# Patient Record
Sex: FEMALE | Race: WHITE | Employment: STUDENT | URBAN - METROPOLITAN AREA
[De-identification: names, ages, dates, MRNs, and addresses within clinical notes are randomized per-mention and may not be internally consistent; named-entity substitution may affect disease eponyms.]

---

## 2017-08-29 ENCOUNTER — MEDICAL CORRESPONDENCE (OUTPATIENT)
Dept: HEALTH INFORMATION MANAGEMENT | Facility: CLINIC | Age: 27
End: 2017-08-29

## 2017-09-27 ENCOUNTER — PRE VISIT (OUTPATIENT)
Dept: NEUROLOGY | Facility: CLINIC | Age: 27
End: 2017-09-27

## 2017-09-27 NOTE — TELEPHONE ENCOUNTER
1.  Date/reason for appt:  10/05/17   Legs - Numbness/Weakness    2.  Referring provider:  Janae    3.  Call to patient (Yes / No - short description):  No referred    4.  Previous care at / records requested from:  Janae

## 2017-10-03 ENCOUNTER — OFFICE VISIT (OUTPATIENT)
Dept: NEUROLOGY | Facility: CLINIC | Age: 27
End: 2017-10-03

## 2017-10-03 DIAGNOSIS — M62.81 GENERALIZED MUSCLE WEAKNESS: Primary | ICD-10-CM

## 2017-10-03 NOTE — PROGRESS NOTES
[facility]  Electrodiagnostic Laboratory    Nerve Conduction & EMG Report          Patient:       Nathalie Valle  Patient ID:    [id number]  Gender:        Female  YOB: 1990  Age:           27 Years 0 Months        History & Examination:  27 year old woman with periodic weakness of legs after sprinting. If she stops sprinting symptoms resolve within seconds, other than residual fatigue. Occurs once per year over last 3-4 years. No similar symptoms in arms. Eval for myopathy vs neuropathy.     Techniques: Motor and sensory conduction studies were done with surface recording electrodes. EMG was done with a concentric needle electrode.     Results:  Nerve conduction studies:   1. Bilateral sural sensory responses were normal.   2. Bilateral peroneal-EDB and tibial-AH motor responses were normal.     Needle EMG of selected right and left lower limb and thoracic paraspinal muscles was performed as tabulated below. No abnormal spontaneous activity was observed in the sampled muscles. Motor unit potential morphology and recruitment patterns were normal.     Interpretation:  This is a normal study. There is no electrophysiologic evidence of a myopathic or neuropathic process affecting the lower limbs on the basis of this study.     Colton Puga MD  Department of Neurology        Sensory NCS      Nerve / Sites Rec. Site Onset Peak NP Amp Ref. PP Amp Dist Mike Ref. Temp     ms ms  V  V  V cm m/s m/s  C   L SURAL - Lat Mall      Calf Ankle 2.71 3.59 17.7 8.0 21.4 14 51.7 38.0 33.8   R SURAL - Lat Mall      Calf Ankle 3.02 3.85 16.4 8.0 15.9 14 46.3 38.0 33.8       Motor NCS      Nerve / Sites Rec. Site Lat Ref. Amp Ref. Rel Amp Dist Mike Ref. Dur. Area Temp.     ms ms mV mV % cm m/s m/s ms %  C   L DEEP PERONEAL - EDB      Ankle EDB 4.58 6.00 5.9 2.5 100 8   5.94 100 34.1      FibHead EDB 11.04  5.4  91.1 29 44.9 38.0 6.41 87.2 33.9      Pop Fos EDB 13.28  5.4  90.2 11 49.1 38.0 6.20 82.1 34   R  DEEP PERONEAL - EDB      Ankle EDB 4.22 6.00 6.0 2.5 100 8   5.99 100 34.1      FibHead EDB 10.16  6.0  99.2 28.5 48.0 38.0 6.88 108 34.3      Pop Fos EDB 12.29  6.1  101 10 46.8 38.0 6.93 109 34.3   L TIBIAL - AH      Ankle AH 3.13 6.00 12.9 4.0 100 8   7.50 100 34.4      Pop Fos AH 12.08  9.9  76.9 39 43.5 38.0 7.92 98.8 34   R TIBIAL - AH      Ankle AH 4.90 6.00 12.5 4.0 100 8   5.99 100 34.1      Pop Fos AH 13.39  3.9  31.5 39 45.9 38.0 7.60 45.9 34.1       F  Wave      Nerve Min F Lat Max F Lat Mean FLat Temp.    ms ms ms  C   L TIBIAL 45.44 48.31 46.71 33.1   R TIBIAL 47.40 51.17 48.49 34       EMG Summary Table     Spontaneous MUAP Recruitment    IA Fib PSW Fasc H.F. Amp Dur. PPP Pattern   L. VAST LATERALIS N None None None None N N N N   L. TIB ANTERIOR N None None None None N N N N   L. GASTROCN (MED) N None None None None N N N N   L. BIC FEM (S HEAD) N None None None None N N N N   L. THOR PSP (M) N None None None None       R. VAST LATERALIS N None None None None N N N N   R. GASTROCN (MED) N None None None None N N N N

## 2017-10-03 NOTE — MR AVS SNAPSHOT
After Visit Summary   10/3/2017    Nathalie Valle    MRN: [id number]           Patient Information     Date Of Birth          1990        Visit Information        Provider Department      10/3/2017 9:00 AM Colton Puga MD [facility] NEUROSPECIALTIES         Follow-ups after your visit        Your next 10 appointments already scheduled     Oct 12, 2017 10:00 AM CDT   (Arrive by 9:45 AM)   New Patient Visit with TRENA Aceves [facility] ([facility])    [address] [place] [id number] [phone]              Who to contact     Please call your clinic at [phone] to:    Ask questions about your health    Make or cancel appointments    Discuss your medicines    Learn about your test results    Speak to your doctor   If you have compliments or concerns about an experience at your clinic, or if you wish to file a complaint, please contact [facility] Patient Relations at [phone] or email us at [email or web address]         Additional Information About Your Visit        MyChart Information     Appcore is an electronic gateway that provides easy, online access to your medical records. With Appcore, you can request a clinic appointment, read your test results, renew a prescription or communicate with your care team.     To sign up for Hydroboltt visit the website at [email or web address]   You will be asked to enter the access code listed below, as well as some personal information. Please follow the directions to create your username and password.     Your access code is: [email or web address]  Expires: 2017  9:30 AM     Your access code will  in 90 days. If you need help or a new code, please contact your [facility] or call [phone] for assistance.        Care EveryWhere ID     This is your Care EveryWhere ID. This could be used  by other organizations to access your [facility] medical records  [id number]         Blood Pressure from Last 3 Encounters:   No data found for BP    Weight from Last 3 Encounters:   No data found for Wt              Today, you had the following     No orders found for display       Primary Care Provider    None Specified       No primary provider on file.        Equal Access to Services     KENZIEDESTINY LORENZO : Hadii aad skyler hadluíso Soomaali, waaxda luqadaha, qaybta kaalmada adeegyada, santi plataaranzanuzhat zamora . So [facility] [phone].    ATENCIÓN: Si habla español, tiene a mckeon disposición servicios gratuitos de asistencia lingüística. Llame al [phone].    We comply with applicable federal civil rights laws and [place] laws. We do not discriminate on the basis of race, color, national origin, age, disability, sex, sexual orientation, or gender identity.            Thank you!     Thank you for choosing [facility] NEUROSPECIALTIES  for your care. Our goal is always to provide you with excellent care. Hearing back from our patients is one way we can continue to improve our services. Please take a few minutes to complete the written survey that you may receive in the mail after your visit with us. Thank you!             Your Updated Medication List - Protect others around you: Learn how to safely use, store and throw away your medicines at www.disposemymeds.org.      Notice  As of 10/3/2017 10:17 AM    You have not been prescribed any medications.

## 2017-10-03 NOTE — LETTER
10/3/2017     RE: Nathalie Valle  [address] [address] [facility] [place] [address]     Dear Colleague,    Thank you for referring your patient, Nathalie Valle, to the P NEUROSPECIALTIES at [facility]. Please see a copy of my visit note below.        [facility]  Electrodiagnostic Laboratory    Nerve     Conduction & EMG Report          Patient:       Nathalie Valle  Patient ID:    [id number]  Gender:        Female  YOB: 1990  Age:           27 Years 0 Months        History & Examination:  27 year old woman with periodic weakness of legs after sprinting. If she stops sprinting symptoms resolve within seconds, other than residual fatigue. Occurs once per year over last 3-4 years. No similar symptoms in arms. Eval for myopathy vs neuropathy.     Techniques: Motor and sensory conduction studies were done with surface recording electrodes. EMG was done with a concentric needle electrode.     Results:  Nerve conduction studies:   1. Bilateral sural sensory responses were normal.   2. Bilateral peroneal-EDB and tibial-AH motor responses were normal.     Needle EMG of selected right and left lower limb and thoracic paraspinal muscles was performed as tabulated below. No abnormal spontaneous activity was observed in the sampled muscles. Motor unit potential morphology and recruitment patterns were normal.     Interpretation:  This is a normal study. There is no electrophysiologic evidence of a myopathic or neuropathic process affecting the lower limbs on the basis of this study.     Colton Puga MD  Department of Neurology        Sensory NCS      Nerve / Sites Rec. Site Onset Peak NP Amp Ref. PP Amp Dist Mike Ref. Temp     ms ms  V  V  V cm m/s m/s  C   L SURAL - Lat Mall      Calf Ankle 2.71 3.59 17.7 8.0 21.4 14 51.7 38.0 33.8   R SURAL - Lat Mall      Calf Ankle 3.02 3.85 16.4 8.0 15.9 14 46.3 38.0 33.8       Motor NCS      Nerve / Sites Rec. Site Lat Ref. Amp  Ref. Rel Amp Dist Mike Ref. Dur. Area Temp.     ms ms mV mV % cm m/s m/s ms %  C   L DEEP PERONEAL - EDB      Ankle EDB 4.58 6.00 5.9 2.5 100 8   5.94 100 34.1      FibHead EDB 11.04  5.4  91.1 29 44.9 38.0 6.41 87.2 33.9    Pop Fos EDB 13.28  5.4  90.2 11 49.1 38.0 6.20 82.1 34   R DEEP PERONEAL - EDB      Ankle EDB 4.22 6.00 6.0 2.5 100 8   5.99 100 34.1      FibHead EDB 10.16  6.0  99.2 28.5 48.0 38.0 6.88 108 34.3     Pop Fos EDB 12.29  6.1  101 10 46.8 38.0 6.93 109 34.3   L TIBIAL - AH      Ankle AH 3.13 6.00 12.9 4.0 100 8   7.50 100 34.4     Pop Fos AH 12.08  9.9  76.9 39 43.5 38.0 7.92 98.8 34   R TIBIAL - AH      Ankle AH 4.90 6.00 12.5 4.0 100 8   5.99 100 34.1      Pop Fos AH 13.39  3.9  31.5 39 45.9 38.0 7.60 45.9 34.1       F  Wave      Nerve Min F Lat Max F Lat Mean FLat Temp.    ms ms ms  C   L TIBIAL 45.44 48.31 46.71 33.1   R TIBIAL 47.40 51.17 48.49 34       EMG Summary Table     Spontaneous MUAP Recruitment    IA Fib PSW Fasc H.F. Amp Dur. PPP Pattern   L. VAST LATERALIS N None None None None N N N N   L. TIB ANTERIOR N None None None None N N N N   L. GASTROCN (MED) N None None None None N N N N   L. BIC FEM (S HEAD) N None None None None N N N N   L. THOR PSP (M) N None None None None       R. VAST LATERALIS N None None None None N N N N   R. GASTROCN (MED) N None None None None N N N N                      Again, thank you for allowing me to participate in the care of your patient.      Sincerely,    Colton Puga MD

## 2017-10-12 ENCOUNTER — OFFICE VISIT (OUTPATIENT)
Dept: NEUROLOGY | Facility: CLINIC | Age: 27
End: 2017-10-12

## 2017-10-12 ENCOUNTER — TELEPHONE (OUTPATIENT)
Dept: NEUROLOGY | Facility: CLINIC | Age: 27
End: 2017-10-12

## 2017-10-12 VITALS — HEIGHT: 66 IN | SYSTOLIC BLOOD PRESSURE: 136 MMHG | HEART RATE: 84 BPM | DIASTOLIC BLOOD PRESSURE: 80 MMHG

## 2017-10-12 DIAGNOSIS — M62.81 EXERCISE-INDUCED WEAKNESS: Primary | ICD-10-CM

## 2017-10-12 ASSESSMENT — PAIN SCALES - GENERAL: PAINLEVEL: NO PAIN (0)

## 2017-10-12 NOTE — LETTER
"10/12/2017       RE: Nathalie Valle  [address] [address] [place] [address]     Dear Colleague,    Thank you for referring your patient, Nathalie Valle, to the [facility] NEUROLOGY at [facility]. Please see a copy of my visit note below.    Re: Nathalie Valle      MRN# [id number]  YOB: 1990  Date of Visit:10/12/2017     OUTPATIENT NEUROLOGY VISIT NOTE    Chief Complaint:  transient bilateral muscle weakness brought by high intensity exercise that require \"burst of energy.\"    History of Present Illness  aNthalie Valle is a 27-year-old right-handed presents to the clinic today for transient bilateral muscle weakness brought by high intensity exercise that require \"burst of energy.\" No significant medical history.   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     Reports over past 4-5 years about once per year (at least) loses \"motor control\" of her legs during high intensity exercise (sprinting/running) and last when she gets her heart rate down and stop running \"legs feeling exhausted\" for the rest of the day. Legs are not painful. No changes in urine color or any other associated symptoms reported. Takes about 4-5 days to get back to \"normal\" and feeling that she can go and run again.  Patient reports that she eats healthy, rest before the game (plays softball recreational), staying hydration. Reports that her legs get \"numb\" and \"jelly feeling\" and looks like \"she broke her legs.\"   Patient reports that it has been re occuring but has not gotten worse.   [facility].   EMG  Interpretation:  This is a normal study. There is no electrophysiologic evidence of a myopathic or neuropathic process affecting the lower limbs on the basis of this study.     Reports concussion in 2011 wake boarding and did not get medical evaluation. " "    Denies history of recent  head or neck trauma, dizziness, vertigo, headaches, seizure, double vision, blurred vision, hearing difficulty, speech or swallowing difficulty, weakness or numbness in face, arms or legs, urinary or bowel incontinence, coordination problems or gait difficulty, fever or chills.    Neurodiagnostic Testing  Interpretation:  This is a normal study. There is no electrophysiologic evidence of a myopathic or neuropathic process affecting the lower limbs on the basis of this study.     CT head none  MRI brain none  Lab  At [facility] -patient will sent to us results  Presumed normal     Past Medical History reviewed and verified with the patient  No significant medical history  Past Surgical History reviewed and verified with the patient  Tonsillectomy in 2009  [place] teeth removed   Family History reviewed and verified with the patient  No neurological history  Social History:  In relationship, lives with SO, has two dogs, grad student full time, was born in [place]  Social History   Substance Use Topics     Smoking status: Never Smoker     Smokeless tobacco: Never Used     Alcohol use Not on file    reviewed and verified with the patient    Current Outpatient Prescriptions   Medication Sig Dispense Refill     Norethindrone Acet-Ethinyl Est (MICROGESTIN 1/20 PO)      reviewed and verified with the patient    Review of Systems:   A 12-point ROS including constitutional, eyes, ENT, respiratory, cardiovascular, gastroenterology, genitourinary, integumentary, musculoskeletal, neurology, hematology and psychiatric were all reviewed with the patient and completed at the Neuroscience Services Question nary and positives as mentioned in the HPI.     General Exam:   /80  Pulse 84  Ht 1.676 m (5' 6\")  GEN: Awake, NAD; good eye contact, responses appropriately, healthy appearing   HEENT: Head atraumatic/Normocephalic. Scalp normal. Pupils equally round, 4 mm, reactive to light and accommodation, sclera " and conjunctiva normal. Fundoscopic examination reveals normal vessels no papilledema.   Neck: Easily moveable without resistance  Heart: S1/S2 appreciated, RRR, no m/r/g, no carotid bruits  Lungs:Lungs are clear to auscultation bilaterally, no wheezes or crackles.   Neurological Examination:  The patient is alert and oriented times four. Has good attention and concentration. Speech is fluent without dysarthria.   Cranial nerves:  CN I deferred.   CN II: Intact and full visual fields to confrontation bilaterally. CN III, IV, VI: EOM intact. There is no nystagmus. Has conjugated gaze. Intact direct and consensual pupillary light reflexes.   CN V: Intact and symmetrical to facial sensation in the V1 through V3 bilaterally.   CN VII: Intact and symmetrical eyebrow and lid raise and eyelid closure, smiles and frown.   CN VIII: Intact to finger rub bilaterally.   CN IX and X: The palates elevates symmetrical. The uvula is midline.   CN XII: The tongue protrudes midline with no atrophy or fasciculations.   Motor exam: The patient has a normal bulk and tone throughout. There is no atrophy, fasciculations, clonus, or abnormal movements appreciated.   Strength Exam:  5/5 strength at shoulder abduction, elbow flexion or extension, wrist flexion or extension, finger abduction, , hip flexion and extension, knee flexion and extension, and dorsiflexion and plantarflexion bilaterally.   Sensation is intact to light touch  throughout. Reflexes are 2+ and symmetrical at biceps, triceps, brachioradialis, patellar, and Achilles.   Negative Babinski with downgoing toes bilaterally.   Coordination reveals finger-nose-finger, rapid alternating movements, with normal speed and accuracy.   Station and gait is normal. There is no ataxia. Can walk on the toes, heels, and tandem walk without difficulty. Has no drift and a negative Romberg. Bradly's negative        Assessment and Plan:  Transient exercise induced bilateral LE weakness.  Question of neuromuscular etiology.    Plan:  Per one of our neuromuscular neurologist recommendations-CK level, Ischemic exercise forearm test through [facility], Neuro-Muscular Clinic referral    I discussed all my recommendation with Nathalie Valle. The patient verbalizes understanding and comfortable with the plan. The patient has our clinic phone number to call with any questions or concerns. All of the patient's questions were answered from the best of my current knowledge.     Thank you for letting me be a part of the treatment team for Nathalie Valle    Time spent with pt answering questions, discussing findings, counseling and coordinating care was more than 50% the appointment time, 56 minutes.         TRENA Lazaro, CNP  [facility]    Answers for HPI/ROS submitted by the patient on 10/12/2017   General Symptoms: No  Skin Symptoms: No  HENT Symptoms: No  EYE SYMPTOMS: No  HEART SYMPTOMS: No  LUNG SYMPTOMS: No  INTESTINAL SYMPTOMS: No  URINARY SYMPTOMS: No  GYNECOLOGIC SYMPTOMS: No  BREAST SYMPTOMS: No  SKELETAL SYMPTOMS: No  BLOOD SYMPTOMS: No  NERVOUS SYSTEM SYMPTOMS: No  MENTAL HEALTH SYMPTOMS: No      Again, thank you for allowing me to participate in the care of your patient.      Sincerely,    TRENA Aceves CNP

## 2017-10-12 NOTE — PATIENT INSTRUCTIONS
Plan:    Plan:  Per one of our neuromuscular neurologist recommendations  CK level  Ischemic exercise forearm test through [facility]  Neuro-Muscular Clinic referral

## 2017-10-12 NOTE — MR AVS SNAPSHOT
After Visit Summary   10/12/2017    Nathalie Valle    MRN: [id number]           Patient Information     Date Of Birth          1990        Visit Information        Provider Department      10/12/2017 10:00 AM Eloina Crowe APRN CNP [facility] Neurology        Today's Diagnoses     Exercise-induced weakness    -  1      Care Instructions    Plan:    Will talk to one of our neuromuscular neurologist for further plan          Follow-ups after your visit        Who to contact     Please call your clinic at [phone] to:    Ask questions about your health    Make or cancel appointments    Discuss your medicines    Learn about your test results    Speak to your doctor   If you have compliments or concerns about an experience at your clinic, or if you wish to file a complaint, please contact [facility] Patient Relations at [phone] or email us at [email or web address]         Additional Information About Your Visit        MyChart Information     iTaggitt gives you secure access to your electronic health record. If you see a primary care provider, you can also send messages to your care team and make appointments. If you have questions, please call your primary care clinic.  If you do not have a primary care provider, please call [phone] and they will assist you.      GeneriMed is an electronic gateway that provides easy, online access to your medical records. With [employer], you can request a clinic appointment, read your test results, renew a prescription or communicate with your care team.     To access your existing account, please contact your [facility] or call [phone] for assistance.        Care EveryWhere ID     This is your Care EveryWhere ID. This could be used by other organizations to access your [place] medical records  [id number]        Your Vitals Were     Pulse Height                84  "1.676 m (5' 6\")           Blood Pressure from Last 3 Encounters:   10/12/17 136/80    Weight from Last 3 Encounters:   No data found for Wt              Today, you had the following     No orders found for display       Primary Care Provider    None Specified       No primary provider on file.        Equal Access to Services     KENZIEDESTINY [facility] : Hadii aad ku hadluíso Soomaali, waaxda luqadaha, qaybta kaalmada adeegyada, waxyolanda dash kristinn galileokatherine lopez lababakleonard . So [facility] [phone].    ATENCIÓN: Si habla español, tiene a mckeon disposición servicios gratuitos de asistencia lingüística. Llame al [phone].    We comply with applicable federal civil rights laws and [place] laws. We do not discriminate on the basis of race, color, national origin, age, disability, sex, sexual orientation, or gender identity.            Thank you!     Thank you for choosing [facility] NEUROLOGY  for your care. Our goal is always to provide you with excellent care. Hearing back from our patients is one way we can continue to improve our services. Please take a few minutes to complete the written survey that you may receive in the mail after your visit with us. Thank you!             Your Updated Medication List - Protect others around you: Learn how to safely use, store and throw away your medicines at www.disposemymeds.org.          This list is accurate as of: 10/12/17 10:43 AM.  Always use your most recent med list.                   Brand Name Dispense Instructions for use Diagnosis    MICROGESTIN 1/20 PO             "

## 2017-10-12 NOTE — NURSING NOTE
Chief Complaint   Patient presents with     Consult     Numbness and weakness of legs      Lindsay Soto CMA

## 2017-10-12 NOTE — PROGRESS NOTES
"Re: Nathalie Valle      MRN# [id number]  YOB: 1990  Date of Visit:10/12/2017     OUTPATIENT NEUROLOGY VISIT NOTE    Chief Complaint:  transient bilateral muscle weakness brought by high intensity exercise that require \"burst of energy.\"    History of Present Illness  Nathalie Valle is a 27-year-old right-handed presents to the clinic today for transient bilateral muscle weakness brought by high intensity exercise that require \"burst of energy.\" No significant medical history.   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     Reports over past 4-5 years about once per year (at least) loses \"motor control\" of her legs during high intensity exercise (sprinting/running) and last when she gets her heart rate down and stop running \"legs feeling exhausted\" for the rest of the day. Legs are not painful. No changes in urine color or any other associated symptoms reported. Takes about 4-5 days to get back to \"normal\" and feeling that she can go and run again.  Patient reports that she eats healthy, rest before the game (plays softball recreational), staying hydration. Reports that her legs get \"numb\" and \"jelly feeling\" and looks like \"she broke her legs.\"   Patient reports that it has been re occuring but has not gotten worse.   Patient saw ortho [facility] and was recommended neurology evaluation.   EMG  Interpretation:  This is a normal study. There is no electrophysiologic evidence of a myopathic or neuropathic process affecting the lower limbs on the basis of this study.     Reports concussion in 2011 wake boarding and did not get medical evaluation.     Denies history of recent  head or neck trauma, dizziness, vertigo, headaches, seizure, double vision, blurred vision, hearing difficulty, speech or swallowing difficulty, weakness or numbness in face, arms or legs, urinary or bowel incontinence, coordination problems or gait " "difficulty, fever or chills.    Neurodiagnostic Testing  Interpretation:  This is a normal study. There is no electrophysiologic evidence of a myopathic or neuropathic process affecting the lower limbs on the basis of this study.     CT head none  MRI brain none  Lab  At [facility] -patient will sent to us results  Presumed normal     Past Medical History reviewed and verified with the patient  No significant medical history  Past Surgical History reviewed and verified with the patient  Tonsillectomy in 2009  [place] teeth removed   Family History reviewed and verified with the patient  No neurological history  Social History:  In relationship, lives with SO, has two dogs, grad student full time, was born in [place]  Social History   Substance Use Topics     Smoking status: Never Smoker     Smokeless tobacco: Never Used     Alcohol use Not on file    reviewed and verified with the patient    Current Outpatient Prescriptions   Medication Sig Dispense Refill     Norethindrone Acet-Ethinyl Est (MICROGESTIN 1/20 PO)      reviewed and verified with the patient    Review of Systems:   A 12-point ROS including constitutional, eyes, ENT, respiratory, cardiovascular, gastroenterology, genitourinary, integumentary, musculoskeletal, neurology, hematology and psychiatric were all reviewed with the patient and completed at the Neuroscience Services Question nary and positives as mentioned in the HPI.     General Exam:   /80  Pulse 84  Ht 1.676 m (5' 6\")  GEN: Awake, NAD; good eye contact, responses appropriately, healthy appearing   HEENT: Head atraumatic/Normocephalic. Scalp normal. Pupils equally round, 4 mm, reactive to light and accommodation, sclera and conjunctiva normal. Fundoscopic examination reveals normal vessels no papilledema.   Neck: Easily moveable without resistance  Heart: S1/S2 appreciated, RRR, no m/r/g, no carotid bruits  Lungs:Lungs are clear to auscultation bilaterally, no wheezes or crackles.   Neurological " Examination:  The patient is alert and oriented times four. Has good attention and concentration. Speech is fluent without dysarthria.   Cranial nerves:  CN I deferred.   CN II: Intact and full visual fields to confrontation bilaterally. CN III, IV, VI: EOM intact. There is no nystagmus. Has conjugated gaze. Intact direct and consensual pupillary light reflexes.   CN V: Intact and symmetrical to facial sensation in the V1 through V3 bilaterally.   CN VII: Intact and symmetrical eyebrow and lid raise and eyelid closure, smiles and frown.   CN VIII: Intact to finger rub bilaterally.   CN IX and X: The palates elevates symmetrical. The uvula is midline.   CN XII: The tongue protrudes midline with no atrophy or fasciculations.   Motor exam: The patient has a normal bulk and tone throughout. There is no atrophy, fasciculations, clonus, or abnormal movements appreciated.   Strength Exam:  5/5 strength at shoulder abduction, elbow flexion or extension, wrist flexion or extension, finger abduction, , hip flexion and extension, knee flexion and extension, and dorsiflexion and plantarflexion bilaterally.   Sensation is intact to light touch  throughout. Reflexes are 2+ and symmetrical at biceps, triceps, brachioradialis, patellar, and Achilles.   Negative Babinski with downgoing toes bilaterally.   Coordination reveals finger-nose-finger, rapid alternating movements, with normal speed and accuracy.   Station and gait is normal. There is no ataxia. Can walk on the toes, heels, and tandem walk without difficulty. Has no drift and a negative Romberg. Lhermitte's negative        Assessment and Plan:  Transient exercise induced bilateral LE weakness. Question of neuromuscular etiology.    Plan:  Per one of our neuromuscular neurologist recommendations-CK level, Ischemic exercise forearm test through [facility], Neuro-Muscular Clinic referral    I discussed all my recommendation with Nathalie Valle. The patient verbalizes  understanding and comfortable with the plan. The patient has our clinic phone number to call with any questions or concerns. All of the patient's questions were answered from the best of my current knowledge.     Thank you for letting me be a part of the treatment team for Nathalie Valle    Time spent with pt answering questions, discussing findings, counseling and coordinating care was more than 50% the appointment time, 56 minutes.         TRENA Lazaro, CNP  [facility]    Answers for HPI/ROS submitted by the patient on 10/12/2017   General Symptoms: No  Skin Symptoms: No  HENT Symptoms: No  EYE SYMPTOMS: No  HEART SYMPTOMS: No  LUNG SYMPTOMS: No  INTESTINAL SYMPTOMS: No  URINARY SYMPTOMS: No  GYNECOLOGIC SYMPTOMS: No  BREAST SYMPTOMS: No  SKELETAL SYMPTOMS: No  BLOOD SYMPTOMS: No  NERVOUS SYSTEM SYMPTOMS: No  MENTAL HEALTH SYMPTOMS: No

## 2017-10-13 ENCOUNTER — TELEPHONE (OUTPATIENT)
Dept: NEUROLOGY | Facility: CLINIC | Age: 27
End: 2017-10-13

## 2017-10-13 NOTE — TELEPHONE ENCOUNTER
Called and spoke to the patient about her upcoming tests. Questions were answered from the best of my knowledge. Patient appreciates the call.

## 2017-10-15 ENCOUNTER — HEALTH MAINTENANCE LETTER (OUTPATIENT)
Age: 27
End: 2017-10-15

## 2017-10-26 DIAGNOSIS — M62.81 EXERCISE-INDUCED WEAKNESS: ICD-10-CM

## 2017-10-26 LAB — CK SERPL-CCNC: 94 U/L (ref 30–225)

## 2017-10-26 NOTE — PROGRESS NOTES
Arnel Zelaya,   Your recent CK (creatinine kinase) was reported as normal. Please let me know if you have any questions.   Take care, Eloina

## 2017-10-30 ENCOUNTER — INFUSION THERAPY VISIT (OUTPATIENT)
Dept: INFUSION THERAPY | Facility: CLINIC | Age: 27
End: 2017-10-30
Attending: NURSE PRACTITIONER
Payer: COMMERCIAL

## 2017-10-30 VITALS
RESPIRATION RATE: 18 BRPM | SYSTOLIC BLOOD PRESSURE: 134 MMHG | DIASTOLIC BLOOD PRESSURE: 84 MMHG | OXYGEN SATURATION: 100 % | TEMPERATURE: 97.5 F | HEART RATE: 89 BPM

## 2017-10-30 DIAGNOSIS — M62.81 EXERCISE-INDUCED WEAKNESS: Primary | ICD-10-CM

## 2017-10-30 LAB
AMMONIA PLAS-SCNC: 19 UMOL/L (ref 10–50)
AMMONIA PLAS-SCNC: 20 UMOL/L (ref 10–50)
AMMONIA PLAS-SCNC: 20 UMOL/L (ref 10–50)
AMMONIA PLAS-SCNC: 48 UMOL/L (ref 10–50)
AMMONIA PLAS-SCNC: 75 UMOL/L (ref 10–50)
AMMONIA PLAS-SCNC: 78 UMOL/L (ref 10–50)
AMMONIA PLAS-SCNC: 78 UMOL/L (ref 10–50)
AMMONIA PLAS-SCNC: 90 UMOL/L (ref 10–50)
CK SERPL-CCNC: 100 U/L (ref 30–225)
CK SERPL-CCNC: 114 U/L (ref 30–225)
LACTATE BLD-SCNC: 1.5 MMOL/L (ref 0.7–2)
LACTATE BLD-SCNC: 1.7 MMOL/L (ref 0.7–2)
LACTATE BLD-SCNC: 2.2 MMOL/L (ref 0.7–2)
LACTATE BLD-SCNC: 3.7 MMOL/L (ref 0.7–2)
LACTATE BLD-SCNC: 5.5 MMOL/L (ref 0.7–2)
LACTATE BLD-SCNC: 5.6 MMOL/L (ref 0.7–2)
LACTATE BLD-SCNC: 5.7 MMOL/L (ref 0.7–2)
LACTATE BLD-SCNC: 5.9 MMOL/L (ref 0.7–2)

## 2017-10-30 PROCEDURE — 82550 ASSAY OF CK (CPK): CPT | Mod: 91 | Performed by: NURSE PRACTITIONER

## 2017-10-30 PROCEDURE — 83605 ASSAY OF LACTIC ACID: CPT | Mod: 91 | Performed by: NURSE PRACTITIONER

## 2017-10-30 PROCEDURE — 82140 ASSAY OF AMMONIA: CPT | Performed by: NURSE PRACTITIONER

## 2017-10-30 PROCEDURE — 84210 ASSAY OF PYRUVATE: CPT | Performed by: NURSE PRACTITIONER

## 2017-10-30 NOTE — MR AVS SNAPSHOT
After Visit Summary   10/30/2017    Nathalie Valle    MRN: [id number]           Patient Information     Date Of Birth          1990        Visit Information        Provider Department      10/30/2017 7:00 AM [facility]; [facility] Specialty and Procedure        Today's Diagnoses     Exercise-induced weakness    -  1       Follow-ups after your visit        Your next 10 appointments already scheduled     Jan 22, 2018  8:00 AM CST   (Arrive by 7:45 AM)   New Muscular Dystrophy with Colton Puga MD   [facility] ([facility])    [address] [place] [id number] [phone]              Who to contact     If you have questions or need follow up information about today's clinic visit or your schedule please contact [facility] SPECIALTY AND PROCEDURE directly at [phone].  Normal or non-critical lab and imaging results will be communicated to you by MyChart, letter or phone within 4 business days after the clinic has received the results. If you do not hear from us within 7 days, please contact the clinic through [employer] or phone. If you have a critical or abnormal lab result, we will notify you by phone as soon as possible.  Submit refill requests through [employer] or call your pharmacy and they will forward the refill request to us. Please allow 3 business days for your refill to be completed.          Additional Information About Your Visit        MyChart Information     [employer] gives you secure access to your electronic health record. If you see a primary care provider, you can also send messages to your care team and make appointments. If you have questions, please call your primary care clinic.  If you do not have a primary care provider, please call [phone] and they will assist you.        Care EveryWhere ID     This is your Care EveryWhere ID. This could be  used by other organizations to access your [place] medical records  [id number]        Your Vitals Were     Pulse Temperature Respirations Pulse Oximetry          89 97.5  F (36.4  C) (Oral) 18 100%         Blood Pressure from Last 3 Encounters:   10/30/17 134/84   10/12/17 136/80    Weight from Last 3 Encounters:   No data found for Wt              We Performed the Following     Ammonia     Ammonia     Ammonia     Ammonia     Ammonia     Ammonia     Ammonia     Ammonia     CK total     CK total     Lactic acid whole blood     Lactic acid whole blood     Lactic acid whole blood     Lactic acid whole blood     Lactic acid whole blood     Lactic acid whole blood     Lactic acid whole blood     Lactic acid whole blood     Pyruvic acid     Pyruvic acid        Primary Care Provider    Physician No Ref-Primary       NO REF-PRIMARY PHYSICIAN        Equal Access to Services     ABRAHAM VENTURA : Hadii barbara Villatoro, waaxda luparuladaha, qaybta kaalmada luis alberto, santi zamora . So [facility] [phone].    ATENCIÓN: Si habla español, tiene a mckeon disposición servicios gratuitos de asistencia lingüística. Llame al [phone].    We comply with applicable federal civil rights laws and [place] laws. We do not discriminate on the basis of race, color, national origin, age, disability, sex, sexual orientation, or gender identity.            Thank you!     Thank you for choosing [facility] SPECIALTY AND PROCEDURE  for your care. Our goal is always to provide you with excellent care. Hearing back from our patients is one way we can continue to improve our services. Please take a few minutes to complete the written survey that you may receive in the mail after your visit with us. Thank you!             Your Updated Medication List - Protect others around you: Learn how to safely use, store and throw away your medicines at www.disposemymeds.org.          This list is accurate as  of: 10/30/17 10:41 AM.  Always use your most recent med list.                   Brand Name Dispense Instructions for use Diagnosis    MICROGESTIN 1/20 PO

## 2017-10-30 NOTE — PROGRESS NOTES
Pt presents for Ischemic Limb test. Vitals obtained and PIV placed. Waited 5 minutes and baseline labs drawn.   BP cuff inflated until no longer able to feel radial pulse. Pt then gripped dynamometer for 1 minute per order. Pt stopped óscar and BP cuff deflated.   Ammonia and lactic acid drawn at the following intervals:  1 min  2 min  3 min  5 min  10 min  30 min     At 60 min ammonia, lactic acid, pyruvic acid, and ck total drawn    PIV d/c'd and pt discharged to home. Pt informed by provider that she would be notified if any abnormalities resulted from test results.

## 2017-11-01 LAB
PYRUVATE CSF-SCNC: 0.1 MMOL/L (ref 0.03–0.11)
PYRUVATE CSF-SCNC: 0.13 MMOL/L (ref 0.03–0.11)

## 2018-01-15 NOTE — TELEPHONE ENCOUNTER
APPT INFO    Date /Time: 1/22/18, 8am    Reason for Appt: Muscular Dystrophy    Ref Provider/Clinic: ANTON [facility]TERO   Are there internal records? Yes/No?  IF YES, list clinic names: [facility] Neurology  [facility] Neuropecialties EMG   Are there outside records? Yes/No? no   Patient Contact (Y/N) & Call Details: no   Action:

## 2018-01-22 ENCOUNTER — PRE VISIT (OUTPATIENT)
Dept: NEUROLOGY | Facility: CLINIC | Age: 28
End: 2018-01-22

## 2018-01-22 ENCOUNTER — OFFICE VISIT (OUTPATIENT)
Dept: NEUROLOGY | Facility: CLINIC | Age: 28
End: 2018-01-22
Payer: COMMERCIAL

## 2018-01-22 VITALS
SYSTOLIC BLOOD PRESSURE: 143 MMHG | OXYGEN SATURATION: 99 % | HEIGHT: 66 IN | HEART RATE: 77 BPM | DIASTOLIC BLOOD PRESSURE: 89 MMHG | WEIGHT: 143.9 LBS | BODY MASS INDEX: 23.13 KG/M2

## 2018-01-22 DIAGNOSIS — G72.3 PERIODIC PARALYSIS: Primary | ICD-10-CM

## 2018-01-22 ASSESSMENT — PAIN SCALES - GENERAL: PAINLEVEL: NO PAIN (0)

## 2018-01-22 NOTE — MR AVS SNAPSHOT
"              After Visit Summary   1/22/2018    Nathalie Valle    MRN: [id number]           Patient Information     Date Of Birth          1990        Visit Information        Provider Department      1/22/2018 8:00 AM Colton Puga MD [facility] Neurology        Today's Diagnoses     Periodic paralysis    -  1       Follow-ups after your visit        Follow-up notes from your care team     Return if symptoms worsen or fail to improve.      Who to contact     Please call your clinic at [phone] to:    Ask questions about your health    Make or cancel appointments    Discuss your medicines    Learn about your test results    Speak to your doctor   If you have compliments or concerns about an experience at your clinic, or if you wish to file a complaint, please contact [facility] Patient Relations at [phone] or email us at [email or web address]         Additional Information About Your Visit        MyChart Information     [employer] gives you secure access to your electronic health record. If you see a primary care provider, you can also send messages to your care team and make appointments. If you have questions, please call your primary care clinic.  If you do not have a primary care provider, please call [phone] and they will assist you.      [employer] is an electronic gateway that provides easy, online access to your medical records. With [employer], you can request a clinic appointment, read your test results, renew a prescription or communicate with your care team.     To access your existing account, please contact your [facility] Clinic or call [phone] for assistance.        Care EveryWhere ID     This is your Care EveryWhere ID. This could be used by other organizations to access your [place] medical records  [id number]        Your Vitals Were     Pulse Height Pulse Oximetry BMI (Body Mass Index)          77 1.676 m (5' 6\") " 99% 23.23 kg/m2         Blood Pressure from Last 3 Encounters:   01/22/18 143/89   10/30/17 134/84   10/12/17 136/80    Weight from Last 3 Encounters:   01/22/18 65.3 kg (143 lb 14.4 oz)              Today, you had the following     No orders found for display       Primary Care Provider Fax #    Physician No Ref-Primary [phone]       No address on file        Equal Access to Services     ABRAHAM VENTURA : Hadii aad ku hadasho Soomaali, waaxda luqadaha, qaybta kaalmada adeegyada, santi hopperin kristinn adeeg john noah . So [facility] [phone].    ATENCIÓN: Si habla español, tiene a mckeon disposición servicios gratuitos de asistencia lingüística. Llame al [phone].    We comply with applicable federal civil rights laws and [place] laws. We do not discriminate on the basis of race, color, national origin, age, disability, sex, sexual orientation, or gender identity.            Thank you!     Thank you for choosing [facility] NEUROLOGY  for your care. Our goal is always to provide you with excellent care. Hearing back from our patients is one way we can continue to improve our services. Please take a few minutes to complete the written survey that you may receive in the mail after your visit with us. Thank you!             Your Updated Medication List - Protect others around you: Learn how to safely use, store and throw away your medicines at www.disposemymeds.org.          This list is accurate as of: 1/22/18  8:50 AM.  Always use your most recent med list.                   Brand Name Dispense Instructions for use Diagnosis    MICROGESTIN 1/20 PO

## 2018-01-22 NOTE — PROGRESS NOTES
"Chief Complaint: leg weakness    History of Present Illness:    Nathalie Valle is a 27 year old woman who I am seeing at the request of Allison Crowe for intermittent leg weakness. Episodes occur about once per year. Onset was when she was 22 or 23 years of age. As a child she had no physical limitations whatsoever. The events always happen when running at full speed. She plays competitive softball. Every occurrence has been during running bases during a softball game. She is able to start a sprint normally and get up to full speed but then her legs turn into \"jelly\". She sometimes slumps to the ground but then can immediately walk to a safe place to sit. Within a minute or to she is recovered to the point that she can continue playing, but has some residual fatigue. She exercises regularly at the gym - cycling, rowing, running - without difficulty. She runs half marathons without difficulty.     She denies frequent cramps or fasciuclations. No dysarthria, dysphagia, ptosis or diplopia. She denies muscle stiffness and muscle relaxation. She has not experienced myoglobinuria or exercise intolerance. She does not describe a second wind phenomena. No numbness or pain. No myalgias and she has no prolonged soreness out of proportion to the level of exertion. She never develops weakness in her arms. No bowel or bladder changes.     She has not noticed symptom changes with temperature or diet. She tries to keep well hydrated during physical activities. She does not take any over the counter supplements.     Prior pertinent laboratory work-up:  10/17: CK 94. Ammonia baseline 20. Lactic acid baseline 2.2. Ammonia peak 90. Lactic acid peak 5.9. Both normalized within 30 min. Pyruvic acid a bit high at baseline but normalized at end of exercise test.   10/17:     Prior electrophysiologic work-up:  10/17: Nerve conduction studies/needle EMG were normal. No evidence for myopathy or neuropathy.     Past Medical History: " "  None    Past Surgical History:  Tonsillectomy    Family history:    There is no known family history of myopathy or other neuromuscular disorders.    Social History:    She denies tobacco, alcohol, or illicit drug use. There is no known exposure to toxins or heavy metals.     Medical Allergies:  NKDA     Current Medications:   Birth control    Review of Systems: A complete review of systems was obtained and was negative except for what was noted above.    Physical examination:    /89 (BP Location: Right arm, Patient Position: Sitting, Cuff Size: Adult Regular)  Pulse 77  Ht 1.676 m (5' 6\")  Wt 65.3 kg (143 lb 14.4 oz)  SpO2 99%  BMI 23.23 kg/m2     General Appearance: NAD    Skin: There are no rashes or other skin lesions.    Musculoskeletal:  There is no scoliosis, lordosis, kyphosis, pes cavus, or hammertoes.    Neurologic examination:    Mental status:  Patient is alert, attentive, and oriented x 3.  Language is coherent and fluent without dysarthria or aphasia.  Memory, comprehension and ability to follow commands were intact.       Cranial nerves:  Pupils were round and reacted to light.  Extraocular movements were full. There was no face, jaw, palate or tongue weakness or atrophy. Hearing was grossly intact.  Shoulder shrug was normal.       Motor exam: No atrophy or fasciculations. No muscle hypertrophy. No action or percussion myotonia or paramyotonia.  Manual muscle testing revealed MRC grade 5/5 strength throughout including proximal and distal muscles of the arms and legs.    Complex motor skills: No tremor or ataxia    Sensory exam revealed normal perception of vibration, light touch, and pin proximally and distally in the arms and legs bilaterally. Romberg sign was absent.       Gait was normal.  She was able to walk on her heels and toes without any difficulty.       Deep tendon reflexes:   Right Left   Triceps 2 2   Biceps 2 2   Brachioradialis 2 2   Knee jerk 2 2   Ankle jerk 2 2 "   Plantar responses were flexor bilaterally.       Assessment and plan:    Nathalie Valle is a 27 year old woman with 3 or 4 episodes of lower limb weakness provoked by sprinting. No other known provoking factors (e.g., nutritional, temperature, etc). Normal examination, CK, exercise test, and EMG are reassuring. It also is very reassuring that the episodes are very infrequent and not always provoked by sprinting, and that she can do other high level activities (e.g., half marathons) without developing this symptom. I reassured her that I do not think that she has a specific neuromuscular condition. The differential includes periodic paralysis, certain metabolic myopathies, and perhaps myotonia congenita - but these would be very unlikely on clinical grounds as well as the normal testing to date. They will not be explored further at this time. She was in agreement with this plan. We also agreed that should the frequency increase then we will proceed with additional testing, probably to start with baseline potassium level and TSH, and then try to capture a potassium level while she is symptomatic. She also will make note if specific provoking factors become more apparent. For now I encouraged optimization of nutrition and hydration before and during activities. All questions answered. I am happy to see her back as needed.   ----

## 2018-01-22 NOTE — LETTER
"1/22/2018       RE: Nathalie Valle  [address]  [facility] [address]     Dear Colleague,    Thank you for referring your patient, Nathalie Valle, to the [facility] NEUROLOGY at [facility]. Please see a copy of my visit note below.    Chief Complaint: leg weakness    History of Present Illness:    Nathalie Valle is a 27 year old woman who I am seeing at the request of Allison Crowe for intermittent leg weakness. Episodes occur about once per year. Onset was when she was 22 or 23 years of age. As a child she had no physical limitations whatsoever. The events always happen when running at full speed. She plays competitive softball. Every occurrence has been during running bases during a softball game. She is able to start a sprint normally and get up to full speed but then her legs turn into \"jelly\". She sometimes slumps to the ground but then can immediately walk to a safe place to sit. Within a minute or to she is recovered to the point that she can continue playing, but has some residual fatigue. She exercises regularly at the gym - cycling, rowing, running - without difficulty. She runs half marathons without difficulty.     She denies frequent cramps or fasciuclations. No dysarthria, dysphagia, ptosis or diplopia. She denies muscle stiffness and muscle relaxation. She has not experienced myoglobinuria or exercise intolerance. She does not describe a second wind phenomena. No numbness or pain. No myalgias and she has no prolonged soreness out of proportion to the level of exertion. She never develops weakness in her arms. No bowel or bladder changes.     She has not noticed symptom changes with temperature or diet. She tries to keep well hydrated during physical activities. She does not take any over the counter supplements.     Prior pertinent laboratory work-up:  10/17: CK 94. Ammonia baseline 20. Lactic acid baseline 2.2. Ammonia peak 90. Lactic acid peak 5.9. " "Both normalized within 30 min. Pyruvic acid a bit high at baseline but normalized at end of exercise test.   10/17:     Prior electrophysiologic work-up:  10/17: Nerve conduction studies/needle EMG were normal. No evidence for myopathy or neuropathy.     Past Medical History:   None    Past Surgical History:  Tonsillectomy    Family history:    There is no known family history of myopathy or other neuromuscular disorders.    Social History:    She denies tobacco, alcohol, or illicit drug use. There is no known exposure to toxins or heavy metals.     Medical Allergies:  NKDA     Current Medications:   Birth control    Review of Systems: A complete review of systems was obtained and was negative except for what was noted above.    Physical examination:    /89 (BP Location: Right arm, Patient Position: Sitting, Cuff Size: Adult Regular)  Pulse 77  Ht 1.676 m (5' 6\")  Wt 65.3 kg (143 lb 14.4 oz)  SpO2 99%  BMI 23.23 kg/m2     General Appearance: NAD    Skin: There are no rashes or other skin lesions.    Musculoskeletal:  There is no scoliosis, lordosis, kyphosis, pes cavus, or hammertoes.    Neurologic examination:    Mental status:  Patient is alert, attentive, and oriented x 3.  Language is coherent and fluent without dysarthria or aphasia.  Memory, comprehension and ability to follow commands were intact.       Cranial nerves:  Pupils were round and reacted to light.  Extraocular movements were full. There was no face, jaw, palate or tongue weakness or atrophy. Hearing was grossly intact.  Shoulder shrug was normal.       Motor exam: No atrophy or fasciculations. No muscle hypertrophy. No action or percussion myotonia or paramyotonia.  Manual muscle testing revealed MRC grade 5/5 strength throughout including proximal and distal muscles of the arms and legs.    Complex motor skills: No tremor or ataxia    Sensory exam revealed normal perception of vibration, light touch, and pin proximally and distally in " the arms and legs bilaterally. Romberg sign was absent.       Gait was normal.  She was able to walk on her heels and toes without any difficulty.       Deep tendon reflexes:   Right Left   Triceps 2 2   Biceps 2 2   Brachioradialis 2 2   Knee jerk 2 2   Ankle jerk 2 2   Plantar responses were flexor bilaterally.       Assessment and plan:    Nathalie Valle is a 27 year old woman with 3 or 4 episodes of lower limb weakness provoked by sprinting. No other known provoking factors (e.g., nutritional, temperature, etc). Normal examination, CK, exercise test, and EMG are reassuring. It also is very reassuring that the episodes are very infrequent and not always provoked by sprinting, and that she can do other high level activities (e.g., half marathons) without developing this symptom. I reassured her that I do not think that she has a specific neuromuscular condition. The differential includes periodic paralysis, certain metabolic myopathies, and perhaps myotonia congenita - but these would be very unlikely on clinical grounds as well as the normal testing to date. They will not be explored further at this time. She was in agreement with this plan. We also agreed that should the frequency increase then we will proceed with additional testing, probably to start with baseline potassium level and TSH, and then try to capture a potassium level while she is symptomatic. She also will make note if specific provoking factors become more apparent. For now I encouraged optimization of nutrition and hydration before and during activities. All questions answered. I am happy to see her back as needed.   ----        Again, thank you for allowing me to participate in the care of your patient.      Sincerely,    Colton Puga MD

## 2018-01-22 NOTE — NURSING NOTE
Chief Complaint   Patient presents with     Consult     [facility] - MUSCULAR DYSTROPHY     Shalini Sheldon MA

## 2020-02-24 ENCOUNTER — HEALTH MAINTENANCE LETTER (OUTPATIENT)
Age: 30
End: 2020-02-24

## 2020-06-04 PROCEDURE — 99207 ZZC NO BILLABLE SERVICE THIS VISIT: CPT

## 2020-06-04 PROCEDURE — U0003 INFECTIOUS AGENT DETECTION BY NUCLEIC ACID (DNA OR RNA); SEVERE ACUTE RESPIRATORY SYNDROME CORONAVIRUS 2 (SARS-COV-2) (CORONAVIRUS DISEASE [COVID-19]), AMPLIFIED PROBE TECHNIQUE, MAKING USE OF HIGH THROUGHPUT TECHNOLOGIES AS DESCRIBED BY CMS-2020-01-R: HCPCS | Mod: 90 | Performed by: OBSTETRICS & GYNECOLOGY

## 2020-06-04 PROCEDURE — 99000 SPECIMEN HANDLING OFFICE-LAB: CPT | Performed by: OBSTETRICS & GYNECOLOGY

## 2020-06-04 NOTE — LETTER
June 5, 2020        Nathalie KINNEY Valle  [address] [place] [address]    This letter provides a written record that you were tested for COVID-19 on 6/4/20.   Your result was negative.    This means that we didn t find the virus that causes COVID-19 in your sample. A test may show negative when you do actually have the virus. This can happen when the virus is in the early stages of infection, before you feel illness symptoms.    Even if you don t have symptoms, they may still appear. For safety, it s very important to follow these rules.    Keep yourself away from others (self-isolation):      Stay home. Don t go to work, school or anywhere else.     Stay in your own room (and use your own bathroom), if you can.    Stay away from others in your home. No hugging, kissing or shaking hands. No visitors.    Clean  high touch  surfaces often (doorknobs, counters, handles, etc.). Use a household cleaning spray or wipes.    Cover your mouth and nose with a mask, tissue or washcloth to avoid spreading germs.    Wash your hands and face often with soap and water.    Stay in self-isolation until you meet ALL of the guidelines below:    1. You have had no fever for at least 72 hours (that is 3 full days of no fever without the use of medicine that reduces fevers), AND  2. other symptoms (such as cough, shortness of breath) have gotten better, AND  3. at least 10 days have passed since your symptoms first appeared.    Going back to work  Check with your employer for any guidelines to follow for going back to work.    Employers: This document serves as formal notice that your employee tested negative for COVID-19, as of the testing date shown above.    For questions regarding this letter or your Negative COVID-19 result, call [phone] between 8A to 6:30P (M-F) and 10A to 6:30P (weekends).

## 2020-06-05 ENCOUNTER — HOSPITAL ENCOUNTER (INPATIENT)
Facility: CLINIC | Age: 30
LOS: 3 days | Discharge: HOME | End: 2020-06-08
Attending: OBSTETRICS & GYNECOLOGY | Admitting: OBSTETRICS & GYNECOLOGY
Payer: COMMERCIAL

## 2020-06-05 LAB
ABO + RH BLD: NORMAL
ABO + RH BLD: NORMAL
BLD GP AB SCN SERPL QL: NORMAL
BLOOD BANK CMNT PATIENT-IMP: NORMAL
HGB BLD-MCNC: 12.4 G/DL (ref 11.7–15.7)
SARS-COV-2 RNA SPEC QL NAA+PROBE: NOT DETECTED
SPECIMEN EXP DATE BLD: NORMAL
SPECIMEN SOURCE: NORMAL

## 2020-06-05 PROCEDURE — 25000132 ZZH RX MED GY IP 250 OP 250 PS 637: Performed by: OBSTETRICS & GYNECOLOGY

## 2020-06-05 PROCEDURE — 86901 BLOOD TYPING SEROLOGIC RH(D): CPT | Performed by: OBSTETRICS & GYNECOLOGY

## 2020-06-05 PROCEDURE — 12000000 ZZH R&B MED SURG/OB

## 2020-06-05 PROCEDURE — 86900 BLOOD TYPING SEROLOGIC ABO: CPT | Performed by: OBSTETRICS & GYNECOLOGY

## 2020-06-05 PROCEDURE — 85018 HEMOGLOBIN: CPT | Performed by: OBSTETRICS & GYNECOLOGY

## 2020-06-05 PROCEDURE — 86850 RBC ANTIBODY SCREEN: CPT | Performed by: OBSTETRICS & GYNECOLOGY

## 2020-06-05 RX ORDER — ONDANSETRON 2 MG/ML
4 INJECTION INTRAMUSCULAR; INTRAVENOUS EVERY 6 HOURS PRN
Status: DISCONTINUED | OUTPATIENT
Start: 2020-06-05 | End: 2020-06-06

## 2020-06-05 RX ORDER — FENTANYL CITRATE 50 UG/ML
50-100 INJECTION, SOLUTION INTRAMUSCULAR; INTRAVENOUS
Status: DISCONTINUED | OUTPATIENT
Start: 2020-06-05 | End: 2020-06-06

## 2020-06-05 RX ORDER — SODIUM CHLORIDE, SODIUM LACTATE, POTASSIUM CHLORIDE, CALCIUM CHLORIDE 600; 310; 30; 20 MG/100ML; MG/100ML; MG/100ML; MG/100ML
INJECTION, SOLUTION INTRAVENOUS CONTINUOUS
Status: DISCONTINUED | OUTPATIENT
Start: 2020-06-05 | End: 2020-06-06

## 2020-06-05 RX ORDER — IBUPROFEN 800 MG/1
800 TABLET, FILM COATED ORAL
Status: DISCONTINUED | OUTPATIENT
Start: 2020-06-05 | End: 2020-06-06

## 2020-06-05 RX ORDER — OXYTOCIN/0.9 % SODIUM CHLORIDE 30/500 ML
100-340 PLASTIC BAG, INJECTION (ML) INTRAVENOUS CONTINUOUS PRN
Status: COMPLETED | OUTPATIENT
Start: 2020-06-05 | End: 2020-06-06

## 2020-06-05 RX ORDER — HYDROXYZINE HYDROCHLORIDE 25 MG/1
25 TABLET, FILM COATED ORAL
Status: DISCONTINUED | OUTPATIENT
Start: 2020-06-05 | End: 2020-06-06

## 2020-06-05 RX ORDER — CARBOPROST TROMETHAMINE 250 UG/ML
250 INJECTION, SOLUTION INTRAMUSCULAR
Status: DISCONTINUED | OUTPATIENT
Start: 2020-06-05 | End: 2020-06-06

## 2020-06-05 RX ORDER — TRANEXAMIC ACID 10 MG/ML
1 INJECTION, SOLUTION INTRAVENOUS EVERY 30 MIN PRN
Status: DISCONTINUED | OUTPATIENT
Start: 2020-06-05 | End: 2020-06-06

## 2020-06-05 RX ORDER — ACETAMINOPHEN 325 MG/1
650 TABLET ORAL EVERY 4 HOURS PRN
Status: DISCONTINUED | OUTPATIENT
Start: 2020-06-05 | End: 2020-06-06

## 2020-06-05 RX ORDER — OXYCODONE AND ACETAMINOPHEN 5; 325 MG/1; MG/1
1 TABLET ORAL
Status: DISCONTINUED | OUTPATIENT
Start: 2020-06-05 | End: 2020-06-06

## 2020-06-05 RX ORDER — OXYTOCIN 10 [USP'U]/ML
10 INJECTION, SOLUTION INTRAMUSCULAR; INTRAVENOUS
Status: DISCONTINUED | OUTPATIENT
Start: 2020-06-05 | End: 2020-06-06

## 2020-06-05 RX ORDER — NALOXONE HYDROCHLORIDE 0.4 MG/ML
.1-.4 INJECTION, SOLUTION INTRAMUSCULAR; INTRAVENOUS; SUBCUTANEOUS
Status: DISCONTINUED | OUTPATIENT
Start: 2020-06-05 | End: 2020-06-06

## 2020-06-05 RX ORDER — METHYLERGONOVINE MALEATE 0.2 MG/ML
200 INJECTION INTRAVENOUS
Status: DISCONTINUED | OUTPATIENT
Start: 2020-06-05 | End: 2020-06-06

## 2020-06-05 RX ADMIN — HYDROXYZINE HYDROCHLORIDE 25 MG: 25 TABLET, FILM COATED ORAL at 23:28

## 2020-06-05 ASSESSMENT — ACTIVITIES OF DAILY LIVING (ADL)
DRESS: 0-->INDEPENDENT
AMBULATION: 0-->INDEPENDENT
BATHING: 0-->INDEPENDENT
TOILETING: 0-->INDEPENDENT
RETIRED_EATING: 0-->INDEPENDENT
TRANSFERRING: 0-->INDEPENDENT
COGNITION: 0 - NO COGNITION ISSUES REPORTED
SWALLOWING: 0-->SWALLOWS FOODS/LIQUIDS WITHOUT DIFFICULTY
FALL_HISTORY_WITHIN_LAST_SIX_MONTHS: NO
RETIRED_COMMUNICATION: 0-->UNDERSTANDS/COMMUNICATES WITHOUT DIFFICULTY

## 2020-06-05 ASSESSMENT — MIFFLIN-ST. JEOR: SCORE: 1519.21

## 2020-06-05 NOTE — PLAN OF CARE
Patient arrived with spouse for induction of labor, fetal head measuring >99%.  External monitors applied, health history obtained.  Patient verbalized understanding and agreement to plan of care.

## 2020-06-05 NOTE — H&P
No significant change in general health status based on examination of the patient, review of Nursing Admission Database and prenatal record. 28yo  admitted for elective induction at 39w0d given fetal head measurements >99%.     , moderate LTV, accels present, no decels.   [place] every 4-5 minutes, mild  SVE Ft/70/-3, posterior, soft  Cook balloon placed with speculum. Cervical balloon filled to 80cc, with vaginal balloon filled to 60cc.     EFW: 7lb 8oz     Isis Grubbs MD

## 2020-06-06 ENCOUNTER — ANESTHESIA (OUTPATIENT)
Dept: OBGYN | Facility: CLINIC | Age: 30
End: 2020-06-06
Payer: COMMERCIAL

## 2020-06-06 ENCOUNTER — ANESTHESIA EVENT (OUTPATIENT)
Dept: OBGYN | Facility: CLINIC | Age: 30
End: 2020-06-06
Payer: COMMERCIAL

## 2020-06-06 PROCEDURE — 25800030 ZZH RX IP 258 OP 636: Performed by: OBSTETRICS & GYNECOLOGY

## 2020-06-06 PROCEDURE — 25000132 ZZH RX MED GY IP 250 OP 250 PS 637: Performed by: OBSTETRICS & GYNECOLOGY

## 2020-06-06 PROCEDURE — 12000000 ZZH R&B MED SURG/OB

## 2020-06-06 PROCEDURE — 0KQM0ZZ REPAIR PERINEUM MUSCLE, OPEN APPROACH: ICD-10-PCS | Performed by: OBSTETRICS & GYNECOLOGY

## 2020-06-06 PROCEDURE — 72200001 ZZH LABOR CARE VAGINAL DELIVERY SINGLE

## 2020-06-06 PROCEDURE — 25000128 H RX IP 250 OP 636: Performed by: ANESTHESIOLOGY

## 2020-06-06 PROCEDURE — 3E0R3BZ INTRODUCTION OF ANESTHETIC AGENT INTO SPINAL CANAL, PERCUTANEOUS APPROACH: ICD-10-PCS | Performed by: ANESTHESIOLOGY

## 2020-06-06 PROCEDURE — 25000125 ZZHC RX 250: Performed by: ANESTHESIOLOGY

## 2020-06-06 PROCEDURE — 10907ZC DRAINAGE OF AMNIOTIC FLUID, THERAPEUTIC FROM PRODUCTS OF CONCEPTION, VIA NATURAL OR ARTIFICIAL OPENING: ICD-10-PCS | Performed by: OBSTETRICS & GYNECOLOGY

## 2020-06-06 PROCEDURE — 40000671 ZZH STATISTIC ANESTHESIA CASE

## 2020-06-06 PROCEDURE — 25000125 ZZHC RX 250: Performed by: OBSTETRICS & GYNECOLOGY

## 2020-06-06 PROCEDURE — 37000011 ZZH ANESTHESIA WARD SERVICE

## 2020-06-06 PROCEDURE — 00HU33Z INSERTION OF INFUSION DEVICE INTO SPINAL CANAL, PERCUTANEOUS APPROACH: ICD-10-PCS | Performed by: ANESTHESIOLOGY

## 2020-06-06 PROCEDURE — 25000128 H RX IP 250 OP 636: Performed by: OBSTETRICS & GYNECOLOGY

## 2020-06-06 RX ORDER — BISACODYL 10 MG
10 SUPPOSITORY, RECTAL RECTAL DAILY PRN
Status: DISCONTINUED | OUTPATIENT
Start: 2020-06-08 | End: 2020-06-08 | Stop reason: HOSPADM

## 2020-06-06 RX ORDER — OXYTOCIN 10 [USP'U]/ML
10 INJECTION, SOLUTION INTRAMUSCULAR; INTRAVENOUS
Status: DISCONTINUED | OUTPATIENT
Start: 2020-06-06 | End: 2020-06-06

## 2020-06-06 RX ORDER — TRANEXAMIC ACID 10 MG/ML
1 INJECTION, SOLUTION INTRAVENOUS EVERY 30 MIN PRN
Status: DISCONTINUED | OUTPATIENT
Start: 2020-06-06 | End: 2020-06-06

## 2020-06-06 RX ORDER — IBUPROFEN 800 MG/1
800 TABLET, FILM COATED ORAL EVERY 6 HOURS PRN
Status: DISCONTINUED | OUTPATIENT
Start: 2020-06-06 | End: 2020-06-08 | Stop reason: HOSPADM

## 2020-06-06 RX ORDER — ONDANSETRON 4 MG/1
4 TABLET, ORALLY DISINTEGRATING ORAL EVERY 6 HOURS PRN
Status: DISCONTINUED | OUTPATIENT
Start: 2020-06-06 | End: 2020-06-06

## 2020-06-06 RX ORDER — ONDANSETRON 2 MG/ML
4 INJECTION INTRAMUSCULAR; INTRAVENOUS EVERY 6 HOURS PRN
Status: DISCONTINUED | OUTPATIENT
Start: 2020-06-06 | End: 2020-06-06

## 2020-06-06 RX ORDER — EPHEDRINE SULFATE 50 MG/ML
5 INJECTION, SOLUTION INTRAMUSCULAR; INTRAVENOUS; SUBCUTANEOUS
Status: DISCONTINUED | OUTPATIENT
Start: 2020-06-06 | End: 2020-06-06

## 2020-06-06 RX ORDER — ACETAMINOPHEN 325 MG/1
650 TABLET ORAL EVERY 4 HOURS PRN
Status: DISCONTINUED | OUTPATIENT
Start: 2020-06-06 | End: 2020-06-06

## 2020-06-06 RX ORDER — NALOXONE HYDROCHLORIDE 0.4 MG/ML
.1-.4 INJECTION, SOLUTION INTRAMUSCULAR; INTRAVENOUS; SUBCUTANEOUS
Status: DISCONTINUED | OUTPATIENT
Start: 2020-06-06 | End: 2020-06-08 | Stop reason: HOSPADM

## 2020-06-06 RX ORDER — BISACODYL 10 MG
10 SUPPOSITORY, RECTAL RECTAL DAILY PRN
Status: DISCONTINUED | OUTPATIENT
Start: 2020-06-08 | End: 2020-06-06

## 2020-06-06 RX ORDER — FENTANYL CITRATE 50 UG/ML
100 INJECTION, SOLUTION INTRAMUSCULAR; INTRAVENOUS
Status: COMPLETED | OUTPATIENT
Start: 2020-06-06 | End: 2020-06-06

## 2020-06-06 RX ORDER — NALBUPHINE HYDROCHLORIDE 20 MG/ML
2.5-5 INJECTION, SOLUTION INTRAMUSCULAR; INTRAVENOUS; SUBCUTANEOUS EVERY 6 HOURS PRN
Status: DISCONTINUED | OUTPATIENT
Start: 2020-06-06 | End: 2020-06-06

## 2020-06-06 RX ORDER — MODIFIED LANOLIN
OINTMENT (GRAM) TOPICAL
Status: DISCONTINUED | OUTPATIENT
Start: 2020-06-06 | End: 2020-06-08 | Stop reason: HOSPADM

## 2020-06-06 RX ORDER — NALOXONE HYDROCHLORIDE 0.4 MG/ML
.1-.4 INJECTION, SOLUTION INTRAMUSCULAR; INTRAVENOUS; SUBCUTANEOUS
Status: DISCONTINUED | OUTPATIENT
Start: 2020-06-06 | End: 2020-06-06

## 2020-06-06 RX ORDER — OXYCODONE HYDROCHLORIDE 5 MG/1
5 TABLET ORAL EVERY 4 HOURS PRN
Status: DISCONTINUED | OUTPATIENT
Start: 2020-06-06 | End: 2020-06-06

## 2020-06-06 RX ORDER — MODIFIED LANOLIN
OINTMENT (GRAM) TOPICAL
Status: DISCONTINUED | OUTPATIENT
Start: 2020-06-06 | End: 2020-06-06

## 2020-06-06 RX ORDER — OXYTOCIN/0.9 % SODIUM CHLORIDE 30/500 ML
100 PLASTIC BAG, INJECTION (ML) INTRAVENOUS CONTINUOUS
Status: DISCONTINUED | OUTPATIENT
Start: 2020-06-06 | End: 2020-06-06

## 2020-06-06 RX ORDER — HYDROCORTISONE 2.5 %
CREAM (GRAM) TOPICAL 3 TIMES DAILY PRN
Status: DISCONTINUED | OUTPATIENT
Start: 2020-06-06 | End: 2020-06-06

## 2020-06-06 RX ORDER — AMOXICILLIN 250 MG
2 CAPSULE ORAL 2 TIMES DAILY
Status: DISCONTINUED | OUTPATIENT
Start: 2020-06-06 | End: 2020-06-06

## 2020-06-06 RX ORDER — AMOXICILLIN 250 MG
2 CAPSULE ORAL 2 TIMES DAILY
Status: DISCONTINUED | OUTPATIENT
Start: 2020-06-06 | End: 2020-06-08 | Stop reason: HOSPADM

## 2020-06-06 RX ORDER — TRANEXAMIC ACID 10 MG/ML
1 INJECTION, SOLUTION INTRAVENOUS EVERY 30 MIN PRN
Status: DISCONTINUED | OUTPATIENT
Start: 2020-06-06 | End: 2020-06-08 | Stop reason: HOSPADM

## 2020-06-06 RX ORDER — OXYCODONE HYDROCHLORIDE 5 MG/1
5 TABLET ORAL EVERY 4 HOURS PRN
Status: DISCONTINUED | OUTPATIENT
Start: 2020-06-06 | End: 2020-06-08 | Stop reason: HOSPADM

## 2020-06-06 RX ORDER — ACETAMINOPHEN 325 MG/1
650 TABLET ORAL EVERY 4 HOURS PRN
Status: DISCONTINUED | OUTPATIENT
Start: 2020-06-06 | End: 2020-06-08 | Stop reason: HOSPADM

## 2020-06-06 RX ORDER — OXYTOCIN/0.9 % SODIUM CHLORIDE 30/500 ML
340 PLASTIC BAG, INJECTION (ML) INTRAVENOUS CONTINUOUS PRN
Status: DISCONTINUED | OUTPATIENT
Start: 2020-06-06 | End: 2020-06-06

## 2020-06-06 RX ORDER — OXYTOCIN/0.9 % SODIUM CHLORIDE 30/500 ML
340 PLASTIC BAG, INJECTION (ML) INTRAVENOUS CONTINUOUS PRN
Status: DISCONTINUED | OUTPATIENT
Start: 2020-06-06 | End: 2020-06-08 | Stop reason: HOSPADM

## 2020-06-06 RX ORDER — HYDROCORTISONE 2.5 %
CREAM (GRAM) TOPICAL 3 TIMES DAILY PRN
Status: DISCONTINUED | OUTPATIENT
Start: 2020-06-06 | End: 2020-06-08 | Stop reason: HOSPADM

## 2020-06-06 RX ORDER — OXYTOCIN 10 [USP'U]/ML
10 INJECTION, SOLUTION INTRAMUSCULAR; INTRAVENOUS
Status: DISCONTINUED | OUTPATIENT
Start: 2020-06-06 | End: 2020-06-08 | Stop reason: HOSPADM

## 2020-06-06 RX ORDER — AMOXICILLIN 250 MG
1 CAPSULE ORAL 2 TIMES DAILY
Status: DISCONTINUED | OUTPATIENT
Start: 2020-06-06 | End: 2020-06-08 | Stop reason: HOSPADM

## 2020-06-06 RX ORDER — IBUPROFEN 800 MG/1
800 TABLET, FILM COATED ORAL EVERY 6 HOURS PRN
Status: DISCONTINUED | OUTPATIENT
Start: 2020-06-06 | End: 2020-06-06

## 2020-06-06 RX ORDER — AMOXICILLIN 250 MG
1 CAPSULE ORAL 2 TIMES DAILY
Status: DISCONTINUED | OUTPATIENT
Start: 2020-06-06 | End: 2020-06-06

## 2020-06-06 RX ORDER — OXYTOCIN/0.9 % SODIUM CHLORIDE 30/500 ML
1-24 PLASTIC BAG, INJECTION (ML) INTRAVENOUS CONTINUOUS
Status: DISCONTINUED | OUTPATIENT
Start: 2020-06-06 | End: 2020-06-06

## 2020-06-06 RX ORDER — OXYTOCIN/0.9 % SODIUM CHLORIDE 30/500 ML
100 PLASTIC BAG, INJECTION (ML) INTRAVENOUS CONTINUOUS
Status: DISCONTINUED | OUTPATIENT
Start: 2020-06-06 | End: 2020-06-08 | Stop reason: HOSPADM

## 2020-06-06 RX ADMIN — OXYTOCIN-SODIUM CHLORIDE 0.9% IV SOLN 30 UNIT/500ML 340 ML/HR: 30-0.9/5 SOLUTION at 18:45

## 2020-06-06 RX ADMIN — EPHEDRINE SULFATE 5 MG: 50 INJECTION, SOLUTION INTRAVENOUS at 13:24

## 2020-06-06 RX ADMIN — IBUPROFEN 800 MG: 800 TABLET ORAL at 20:49

## 2020-06-06 RX ADMIN — Medication 2 MILLI-UNITS/MIN: at 06:08

## 2020-06-06 RX ADMIN — SODIUM CHLORIDE, POTASSIUM CHLORIDE, SODIUM LACTATE AND CALCIUM CHLORIDE: 600; 310; 30; 20 INJECTION, SOLUTION INTRAVENOUS at 06:01

## 2020-06-06 RX ADMIN — Medication 10 ML/HR: at 12:35

## 2020-06-06 RX ADMIN — FENTANYL CITRATE 100 MCG: 50 INJECTION, SOLUTION INTRAMUSCULAR; INTRAVENOUS at 11:54

## 2020-06-06 RX ADMIN — SODIUM CHLORIDE, POTASSIUM CHLORIDE, SODIUM LACTATE AND CALCIUM CHLORIDE 1000 ML: 600; 310; 30; 20 INJECTION, SOLUTION INTRAVENOUS at 12:16

## 2020-06-06 RX ADMIN — FENTANYL CITRATE 100 MCG: 50 INJECTION, SOLUTION INTRAMUSCULAR; INTRAVENOUS at 12:33

## 2020-06-06 NOTE — PROVIDER NOTIFICATION
06/06/20 1353   Provider Notification   Provider Name/Title Dr. SILVESTRE Shultz   Method of Notification Electronic Page   Request Evaluate - Remote   Notification Reason Status Update     FYI - ephedrine given at 1324.  decelerations resolved.  Moderate variability with accelerations present.  Will continue to increase Pitocin as needed.

## 2020-06-06 NOTE — PROVIDER NOTIFICATION
06/06/20 0522   Provider Notification   Provider Name/Title Dr. Grubbs   Method of Notification At Bedside   Request Evaluate - Remote   Dr. Grubbs paged re: Cook balloon has been in for 12 hours.  Orders received to remove balloon, perform SVE, and start pitocin. Would like a text page with SVE.

## 2020-06-06 NOTE — L&D DELIVERY NOTE
male infant, weight 7#9, Apgars 8/9  2nd degree lac repaired with 3-0 vicryl   ml    Carrie Shultz MD

## 2020-06-06 NOTE — ANESTHESIA PROCEDURE NOTES
Procedure note : epidural catheter  Staff -   Anesthesiologist:  Dominick Jimenez MD      Performed By: anesthesiologist        Pre-Procedure  Performed by Dominick Jimenez MD  Location: floor, OB    Procedure Times:6/6/2020 12:24 PM and 6/6/2020 12:40 PM  Pre-Anesthestic Checklist: patient identified, IV checked, risks and benefits discussed, informed consent, monitors and equipment checked, pre-op evaluation and at physician/surgeon's request    Timeout  Correct Patient: Yes   Correct Procedure: Yes   Correct Site: Yes   Correct Laterality: N/A   Correct Position: Yes   Site Marked: No   .   Procedure Documentation    .    Procedure: epidural catheter, .   Patient Position:sitting Insertion Site:L3-4  (midline approach) Injection technique: LORT saline   Local skin infiltrated with 3 mL of 1% lidocaine.  LION at 5.5 cm    Patient Prep/Sterile Barriers; mask, sterile gloves, povidone-iodine 7.5% surgical scrub, patient draped.  .  Needle: Touhy needle   Needle Gauge: 17.    Needle Length (Inches) 3.5   # of attempts: 1 and # of redirects:  .    Catheter: 19 G . .  Catheter threaded easily  5 cm epidural space.  10.5 cm at skin.   .    Assessment/Narrative  Paresthesias: No.  .  .  Aspiration negative for heme or CSF  . Test dose of 5 mL lidocaine 1.5% w/ 1:200,000 epinephrine at 12:33.  Test dose negative for signs of intravascular, subdural or intrathecal injection. Comments:  I or my partner am immediately available. I or my partner will monitor the patient and supervise nursing care at necessary intervals.    Given 10 ml infusion  (0.125% bupivicaine/fentanyl 2 mcg/ml ) with 100 mcg fentanyl

## 2020-06-06 NOTE — PROGRESS NOTES
"Comfortable  /85   Temp 98.1  F (36.7  C) (Oral)   Resp 14   Ht 1.651 m (5' 5\")   Wt 79.8 kg (176 lb)   BMI 29.29 kg/m     [place]: q5  FHT 140s, mod variability, no decels  SVE /-2 AROM clear    A/P: 29 year old  @ 39.2 for elective IOL, large head measurements  - Pitocin    Carrie Shultz MD   "

## 2020-06-06 NOTE — PROVIDER NOTIFICATION
06/06/20 1700   Provider Notification   Provider Name/Title Dr. SILVESTRE Shultz   Method of Notification Phone   Request Evaluate - Remote   Notification Reason Status Update;SVE     Will check cervix at 1800 and call with update.

## 2020-06-06 NOTE — PROVIDER NOTIFICATION
06/06/20 1752   Provider Notification   Provider Name/Title Dr. SILVESTRE Shultz   Method of Notification Phone   Request Attend Delivery     Dr. SILVESTRE Shultz will come now for delivery.

## 2020-06-06 NOTE — PROVIDER NOTIFICATION
06/06/20 0739   Vaginal Exam   Method sterile exam per physician   Cervical Consistency soft   Cervical Dilation (cm) 4   Cervical Effacement 80-90%   Fetal Station -3   Membranes   Membrane Status Ruptured    Sac Identifier Sac 1   Rupture Type AROM   Rupture Date 06/06/20   Rupture Time 0739   Amniotic Fluid Color Clear   Amniotic Fluid Odor Normal   Amniotic Fluid Amount Moderate   Provider Notification   Provider Name/Title Dr. SILVESTRE Shultz   Method of Notification Phone   Request Evaluate in Person   Notification Reason SVE;Membrane Status

## 2020-06-06 NOTE — PROVIDER NOTIFICATION
06/05/20 2053   Provider Notification   Provider Name/Title Dr. Grubbs   Method of Notification Phone   Request Evaluate - Remote   Notification Reason Fetal Baseline Change   Dr. Grubbs notified re: Baseline has been 120 (category 1), most recently at 105. Moderate variability, accels present.  No new orders.

## 2020-06-06 NOTE — PROGRESS NOTES
, moderate LTV, accels present. Contractions every 8-10 minutes.     Cook balloon reattached to leg with more tension.     Isis Grubbs MD

## 2020-06-06 NOTE — PROVIDER NOTIFICATION
06/06/20 1320   Provider Notification   Provider Name/Title Dr. SILVESTRE Shultz   Method of Notification Phone   Request Evaluate - Remote   Notification Reason SVE;Status Update;Variability Change;Decels;Pain     Patient resting comfortably with epidural.  SVE 4/80/-1.  Recurrent early decelerations with minimal variability present.  BP slightly lower than baseline.  Orders received for ephedrine.  May also give D5LR.  Will update if variability does not improve.

## 2020-06-06 NOTE — PROVIDER NOTIFICATION
06/05/20 0724   Provider Notification   Provider Name/Title Dr. Grubbs   Method of Notification At Bedside   Dr. Grubbs at bedside. Pulled and re-taped cook edel. Order for 25mg vistaril PO for sleep.

## 2020-06-06 NOTE — ANESTHESIA PREPROCEDURE EVALUATION
"Anesthesia Pre-Procedure Evaluation    Patient: Nathalie Valle   MRN: [id number] : 1990          Preoperative Diagnosis: * No pre-op diagnosis entered *    * No procedures listed *    History reviewed. No pertinent past medical history.  Past Surgical History:   Procedure Laterality Date     TONSILLECTOMY       wisdom teeth       Anesthesia Evaluation       history and physical reviewed .             ROS/MED HX    ENT/Pulmonary:  - neg pulmonary ROS     Neurologic:  - neg neurologic ROS     Cardiovascular:  - neg cardiovascular ROS       METS/Exercise Tolerance:     Hematologic:         Musculoskeletal:         GI/Hepatic:  - neg GI/hepatic ROS       Renal/Genitourinary:         Endo:         Psychiatric:         Infectious Disease:         Malignancy:         Other:                     neg OB ROS            Physical Exam      Airway     Dental     Cardiovascular       Pulmonary     Other findings:  at term, in labor, requesting pain  management        Lab Results   Component Value Date    HGB 12.4 2020    CARLOS 19 10/30/2017       Preop Vitals  BP Readings from Last 3 Encounters:   20 (!) 129/91   18 143/89   10/30/17 134/84    Pulse Readings from Last 3 Encounters:   18 77   10/30/17 89   10/12/17 84      Resp Readings from Last 3 Encounters:   20 18   10/30/17 18    SpO2 Readings from Last 3 Encounters:   18 99%   10/30/17 100%      Temp Readings from Last 1 Encounters:   20 98  F (36.7  C) (Oral)    Ht Readings from Last 1 Encounters:   20 1.651 m (5' 5\")      Wt Readings from Last 1 Encounters:   20 79.8 kg (176 lb)    Estimated body mass index is 29.29 kg/m  as calculated from the following:    Height as of this encounter: 1.651 m (5' 5\").    Weight as of this encounter: 79.8 kg (176 lb).       Anesthesia Plan      History & Physical Review      ASA Status:  2 .  OB Epidural Asa: 2       Plan for Epidural     Discussed risks of epidural " catheter placement, including, but not limited to, bruising/bleeding, infection, pain, failure of epidural medications to relieve pain, dural puncture with subsequent headache/ need for epidural blood patch and nerve damage.  All questions answered, understanding voiced and she wishes to proceed.        Postoperative Care      Consents  Anesthetic plan, risks, benefits and alternatives discussed with:  Patient..                 Dominick Jimenez MD                    .

## 2020-06-06 NOTE — PROVIDER NOTIFICATION
06/06/20 1520   Provider Notification   Provider Name/Title Dr. SILVESTRE Shultz   Method of Notification Phone   Request Evaluate - Remote   Notification Reason SVE;Status Update;Decels     Recurrent decelerations noted x30 min, minimal/moderate variability.  Appears improved after repositioning on right side.  SVE 6/80/-1.  Will continue to monitor.

## 2020-06-07 PROCEDURE — 12000000 ZZH R&B MED SURG/OB

## 2020-06-07 PROCEDURE — 25000132 ZZH RX MED GY IP 250 OP 250 PS 637: Performed by: OBSTETRICS & GYNECOLOGY

## 2020-06-07 RX ADMIN — SENNOSIDES AND DOCUSATE SODIUM 1 TABLET: 8.6; 5 TABLET ORAL at 08:28

## 2020-06-07 RX ADMIN — ACETAMINOPHEN 650 MG: 325 TABLET, FILM COATED ORAL at 16:27

## 2020-06-07 RX ADMIN — IBUPROFEN 800 MG: 800 TABLET ORAL at 08:28

## 2020-06-07 RX ADMIN — IBUPROFEN 800 MG: 800 TABLET ORAL at 02:47

## 2020-06-07 RX ADMIN — IBUPROFEN 800 MG: 800 TABLET ORAL at 20:19

## 2020-06-07 RX ADMIN — IBUPROFEN 800 MG: 800 TABLET ORAL at 14:29

## 2020-06-07 RX ADMIN — SENNOSIDES AND DOCUSATE SODIUM 1 TABLET: 8.6; 5 TABLET ORAL at 20:19

## 2020-06-07 RX ADMIN — ACETAMINOPHEN 650 MG: 325 TABLET, FILM COATED ORAL at 21:53

## 2020-06-07 NOTE — PLAN OF CARE
Meeting expected goals. Taking Ibuprofen and tylenol for juanita discomfort.  Instructed to fill out paperwork for discharge and watch videos.  Asking questions and eager to learn.

## 2020-06-07 NOTE — PLAN OF CARE
Data: Vital signs within normal limits. Postpartum checks within normal limits - see flow record. Patient eating and drinking normally. Patient able to empty bladder independently and is up ambulating. No apparent signs of infection. Perineal laceration  healing well, using ice and tucks.Patient performing self cares and is able to care for infant.  Action: Patient medicated during the shift for pain. See MAR. Patient reassessed within 1 hour after each medication and pain was improved - patient stated she was comfortable. Patient education done about . See flow record.  Response: Positive attachment behaviors observed with infant. Support persons spouse  present.   Plan: Anticipate discharge tomorrow.

## 2020-06-07 NOTE — LACTATION NOTE
Lactation in to see patient. Educated on basic breastfeeding information. All question answered at this time. Assisted with latch. Good latch observed, swallows pointed out to parents. Hand expressed before getting baby on to show parents that there is colostrum there. Knows to call for assistance.

## 2020-06-07 NOTE — ANESTHESIA POSTPROCEDURE EVALUATION
Patient: Nathalie Valle    * No procedures listed *    Diagnosis:* No pre-op diagnosis entered *  Diagnosis Additional Information: IUP, in labor    Anesthesia Type:  Epidural    Note:  Anesthesia Post Evaluation         Comments:     S/P epidural for labor.   I or my partner was immediately available for management of this patient during epidural analgesia infusion.  VSS.  Doing well. Block resolved.  Neuro at baseline. Denies positional headache. Minimal side effects easily managed w/ PRN meds. No apparent anesthetic complications. No follow-up required.    Manoj Puga MD          Last vitals:  Vitals:    06/07/20 0500 06/07/20 0844 06/07/20 1631   BP: 134/79 122/77 120/75   Pulse: 83  93   Resp: 16 18 18   Temp: 97.7  F (36.5  C) 97.4  F (36.3  C) 98  F (36.7  C)         Electronically Signed By: Manoj Puga MD  June 7, 2020  4:51 PM

## 2020-06-07 NOTE — L&D DELIVERY NOTE
Delivery Date:  2020      DIAGNOSIS:  A 39-week gestation, suspected large fetal head measurements.      PROCEDURE:  Cervical ripening with Cook catheter, induction of labor, normal spontaneous vaginal delivery.      ANESTHESIA:  Epidural.      QUANTITATIVE BLOOD LOSS:  200 mL.      FINDINGS:  Liveborn male infant.  Weight is 7#9.  Apgars were 8 and 9 at 1 and 5 minutes respectively.  There was a second-degree midline laceration repaired with 3-0 Vicryl.      HISTORY:  The patient is a 29-year-old  1 who presents at 39 weeks' gestation for elective induction of labor.  Her prenatal course was complicated by suspected large head measurements greater than the 99th percentile at term.  Her cervix was unfavorable.  However, given these findings, she was offered elective induction with cervical ripening and elected to proceed.  Nonstress test was reactive on presentation.      HOSPITAL COURSE:  The patient was admitted on .  Cook cervical ripening balloon was placed.  Overnight she was crampy and feeling contractions.  On the morning of , the catheter was removed, and she was found to be 4 cm dilated, 70% effaced at the -2 station.  Amniotomy was performed with clear fluid, and she was started on Pitocin.  She was known to be group B strep negative.  She received an epidural for pain control and subsequently made rapid progress to become complete with a strong urge to push.      DESCRIPTION OF PROCEDURE:  The patient pushed over only 13 minutes.  The fetal vertex delivered in the left occiput anterior presentation.  There was a single nuchal cord that was reduced on the perineum.  The anterior shoulder delivered easily, and the remainder of the body followed spontaneously.  Cord clamping was delayed by 1 minute, at which time the cord was doubly clamped and cut by the father of the baby.  The placenta delivered spontaneously, was found to be intact with a 3-vessel cord.  The cervix, vagina and  perineum were inspected and noted to contain a second-degree midline laceration.  This was reapproximated with 3-0 Vicryl in the standard fashion.  The patient tolerated the procedure without difficulty, and she remained in her delivery room in stable condition.  Sponge, lap and needle counts were correct.         SOBEIDA LEUNG MD             D: 2020   T: 2020   MT: YOSI      Name:     MAYELIN MCKEON   MRN:      [id number]        Account:        [id number]   :      1990        Delivery Date:  2020               Document: [id number]

## 2020-06-07 NOTE — PLAN OF CARE
Data: Nathalie Valle transferred to room 445 via wheelchair at 2120. Baby transferred via parent's arms.  Action: Receiving unit notified of transfer: Yes. Patient and family notified of room change. Report given to Emili BUSTAMANTE at 2115. Belongings sent to receiving unit. Accompanied by Registered Nurse. Oriented patient to surroundings. Call light within reach. ID bands double-checked with receiving RN.  Response: Patient tolerated transfer and is stable.  Patients mobililty level scored using the bedside mobility assistance tool (BMAT). Patient is at a mobility level test number: 4. Mobility equipment used: none required. Required assist of 0 staff members. Further use of BMAT scoring not required.

## 2020-06-07 NOTE — PROGRESS NOTES
[facility]   Obstetrics Progress Note    Subjective:   This is the patient's first day since Vaginal delivery. She is doing well.  She is ambulating and urinating on her own. Pain is well controlled with medication. Breastfeeding is going well. Lochia is within normal limits.     Objective:   All vitals stable  Temp: 97.4  F (36.3  C) Temp src: Oral BP: 122/77 Pulse: 83 Heart Rate: 90 Resp: 18          Constitutional: healthy, alert, no distress.   Abdomen: Abdomen soft, non-tender. BS normal. No masses, fundus is firm.  JOINT/EXTREMITIES: extremities normal     Last hemoglobin was   Hemoglobin   Date Value Ref Range Status   06/05/2020 12.4 11.7 - 15.7 g/dL Final   ]    Assessment:   Stable postpartum course.    Plan:   Routine cares. Ambulation encouraged  Breast feeding strategies discussed  Pain control: ibuprofen and acetaminophen PRN  Diet as tolerated  Activity as tolerated  Dispo: anticipate discharge tomorrow      Carrie Shultz MD

## 2020-06-07 NOTE — PLAN OF CARE
VSS.  Up independently, voiding without difficulty.  Pain well controlled with ibuprofen.  Breast feeding  with some assistance.  FOB at bedside and supportive.  Will continue to monitor.

## 2020-06-08 VITALS
SYSTOLIC BLOOD PRESSURE: 117 MMHG | WEIGHT: 176 LBS | TEMPERATURE: 97.8 F | BODY MASS INDEX: 29.32 KG/M2 | DIASTOLIC BLOOD PRESSURE: 71 MMHG | HEIGHT: 65 IN | HEART RATE: 85 BPM | RESPIRATION RATE: 16 BRPM

## 2020-06-08 PROCEDURE — 25000132 ZZH RX MED GY IP 250 OP 250 PS 637: Performed by: OBSTETRICS & GYNECOLOGY

## 2020-06-08 RX ADMIN — IBUPROFEN 800 MG: 800 TABLET ORAL at 04:55

## 2020-06-08 RX ADMIN — ACETAMINOPHEN 650 MG: 325 TABLET, FILM COATED ORAL at 08:27

## 2020-06-08 RX ADMIN — SENNOSIDES AND DOCUSATE SODIUM 2 TABLET: 8.6; 5 TABLET ORAL at 08:27

## 2020-06-08 NOTE — DISCHARGE INSTRUCTIONS
Lactation contact number: [phone]      Schedule postpartum visit with your provider and return to clinic in 6 weeks.     Tylenol 325-650mg every 4 hours as needed   Ibuprofen 400-600mg every 6 hours as needed          Postpartum Vaginal Delivery Instructions    Activity       Ask family and friends for help when you need it.    Do not place anything in your vagina for 6 weeks.    You are not restricted on other activities, but take it easy for a few weeks to allow your body to recover from delivery.  You are able to do any activities you feel up to that point.    No driving until you have stopped taking your pain medications (usually two weeks after delivery).     Call your health care provider if you have any of these symptoms:       Increased pain, swelling, redness, or fluid around your stiches from an episiotomy or perineal tear.    A fever above 100.4 F (38 C) with or without chills when placing a thermometer under your tongue.    You soak a sanitary pad with blood within 1 hour, or you see blood clots larger than a golf ball.    Bleeding that lasts more than 6 weeks.    Vaginal discharge that smells bad.    Severe pain, cramping or tenderness in your lower belly area.    A need to urinate more frequently (use the toilet more often), more urgently (use the toilet very quickly), or it burns when you urinate.    Nausea and vomiting.    Redness, swelling or pain around a vein in your leg.    Problems breastfeeding or a red or painful area on your breast.    Chest pain and cough or are gasping for air.    Problems coping with sadness, anxiety, or depression.  If you have any concerns about hurting yourself or the baby, call your provider immediately.     You have questions or concerns after you return home.     Keep your hands clean:  Always wash your hands before touching your perineal area and stitches.  This helps reduce your risk of infection.  If your hands aren't dirty, you may use an alcohol hand-rub to  clean your hands. Keep your nails clean and short.

## 2020-06-08 NOTE — PLAN OF CARE
Vitals stable throughout the day, fundus firm and midline, bleeding controlled. Pain controlled with Tylenol and Ibuprofen. Breastfeeding every 2-3 hours with an adequate latch score. Bonding well with baby, responding to infant cues. Discharge education completed, reported no further questions. Will follow up in clinic for post partum check.    Walked out with family at 1030

## 2020-06-08 NOTE — PROGRESS NOTES
PPD2    Patient just stepped into shower   reports plan is for discharge today    Review of chart reveals no abnormalities    Plan discharge

## 2020-06-08 NOTE — PLAN OF CARE
Patient vital signs stable and meeting expected outcomes.  Breastfeeding well independently.  Up independently and voiding adequately.  Pain well controlled with ibuprofen.  Able to perform all cares for self and infant.  Bonding well with baby.  FOB present and supportive at bedside.  Will continue to monitor.

## 2020-12-13 ENCOUNTER — HEALTH MAINTENANCE LETTER (OUTPATIENT)
Age: 30
End: 2020-12-13

## 2021-04-17 ENCOUNTER — HEALTH MAINTENANCE LETTER (OUTPATIENT)
Age: 31
End: 2021-04-17

## 2021-09-26 ENCOUNTER — HEALTH MAINTENANCE LETTER (OUTPATIENT)
Age: 31
End: 2021-09-26

## 2022-05-08 ENCOUNTER — HEALTH MAINTENANCE LETTER (OUTPATIENT)
Age: 32
End: 2022-05-08

## 2023-04-23 ENCOUNTER — HEALTH MAINTENANCE LETTER (OUTPATIENT)
Age: 33
End: 2023-04-23

## 2023-06-02 ENCOUNTER — HEALTH MAINTENANCE LETTER (OUTPATIENT)
Age: 33
End: 2023-06-02